# Patient Record
Sex: MALE | URBAN - NONMETROPOLITAN AREA
[De-identification: names, ages, dates, MRNs, and addresses within clinical notes are randomized per-mention and may not be internally consistent; named-entity substitution may affect disease eponyms.]

---

## 2023-11-20 ENCOUNTER — APPOINTMENT (RX ONLY)
Dept: URBAN - NONMETROPOLITAN AREA CLINIC 15 | Facility: CLINIC | Age: 53
Setting detail: DERMATOLOGY
End: 2023-11-20

## 2023-11-20 DIAGNOSIS — L663 OTHER SPECIFIED DISEASES OF HAIR AND HAIR FOLLICLES: ICD-10-CM

## 2023-11-20 DIAGNOSIS — L70.8 OTHER ACNE: ICD-10-CM

## 2023-11-20 DIAGNOSIS — L73.9 FOLLICULAR DISORDER, UNSPECIFIED: ICD-10-CM

## 2023-11-20 DIAGNOSIS — L738 OTHER SPECIFIED DISEASES OF HAIR AND HAIR FOLLICLES: ICD-10-CM

## 2023-11-20 DIAGNOSIS — L21.8 OTHER SEBORRHEIC DERMATITIS: ICD-10-CM

## 2023-11-20 PROBLEM — L02.426 FURUNCLE OF LEFT LOWER LIMB: Status: ACTIVE | Noted: 2023-11-20

## 2023-11-20 PROBLEM — L02.425 FURUNCLE OF RIGHT LOWER LIMB: Status: ACTIVE | Noted: 2023-11-20

## 2023-11-20 PROCEDURE — ? COUNSELING

## 2023-11-20 PROCEDURE — 99203 OFFICE O/P NEW LOW 30 MIN: CPT | Mod: 25

## 2023-11-20 PROCEDURE — ? INJECTION

## 2023-11-20 PROCEDURE — 11900 INJECT SKIN LESIONS </W 7: CPT

## 2023-11-20 ASSESSMENT — LOCATION ZONE DERM
LOCATION ZONE: FACE
LOCATION ZONE: LEG

## 2023-11-20 ASSESSMENT — LOCATION SIMPLE DESCRIPTION DERM
LOCATION SIMPLE: LEFT POSTERIOR THIGH
LOCATION SIMPLE: RIGHT POSTERIOR THIGH
LOCATION SIMPLE: CHIN
LOCATION SIMPLE: LEFT EYEBROW
LOCATION SIMPLE: GLABELLA

## 2023-11-20 ASSESSMENT — LOCATION DETAILED DESCRIPTION DERM
LOCATION DETAILED: RIGHT PROXIMAL POSTERIOR THIGH
LOCATION DETAILED: GLABELLA
LOCATION DETAILED: LEFT CENTRAL EYEBROW
LOCATION DETAILED: LEFT CHIN
LOCATION DETAILED: LEFT PROXIMAL LATERAL POSTERIOR THIGH

## 2023-11-20 NOTE — PROCEDURE: INJECTION
Dose Administered (Numbers Only - Mg, G, Mcg, Units, Cc): 2.5
Treatment Number: 1
Procedure Information: Please note that the numeric value listed in the Medication (1) and associated J-code units and Medication (2) and associated J-code units variables are j-code amounts and do not represent either the concentration or the total amount of the medications injected.  I strongly recommend selecting no to the Render J-code information in note question. This will allow your note to be more clear. If you are billing j-codes with your injection codes you need to document the total amount of the medication injected. This amount should match the j-code units. For example, if you are injecting Triamcinolone 40mg as an intramuscular injection you would select 40 for the dose field.. This would allow you to document  with 4 units (40mg = 10mg x 4). The total volume is not used to calculate j-codes only the amount of the medication administered.
Consent: The risks of the medication was reviewed with the patient.
Type Of Vial Used?: Multi-Dose
Medication (1) And Associated J-Code Units: Triamcinolone acetonide, 10mg
Hide Second Medication?: No
Lot # (Optional): [id number]
Detail Level: None
Post-Care Instructions: I reviewed with the patient in detail post-care instructions. Patient understands to keep the injection sites clean and call the clinic if there is any redness, swelling or pain.
Route: IL
Total Volume Injected In Cc (Will Not Affected Billing): 0.2
Expiration Date (Optional): 09/2025
Dose Administered (Numbers Only - Mg, G, Mcg, Units, Cc): 0

## 2024-01-16 ENCOUNTER — OFFICE VISIT (OUTPATIENT)
Dept: URGENT CARE | Facility: PHYSICIAN GROUP | Age: 54
End: 2024-01-16
Payer: COMMERCIAL

## 2024-01-16 ENCOUNTER — HOSPITAL ENCOUNTER (OUTPATIENT)
Dept: LAB | Facility: MEDICAL CENTER | Age: 54
End: 2024-01-16
Attending: NURSE PRACTITIONER
Payer: COMMERCIAL

## 2024-01-16 VITALS
TEMPERATURE: 97.2 F | HEART RATE: 87 BPM | OXYGEN SATURATION: 97 % | SYSTOLIC BLOOD PRESSURE: 122 MMHG | BODY MASS INDEX: 28.63 KG/M2 | DIASTOLIC BLOOD PRESSURE: 78 MMHG | HEIGHT: 70 IN | WEIGHT: 200 LBS | RESPIRATION RATE: 16 BRPM

## 2024-01-16 DIAGNOSIS — M79.672 LEFT FOOT PAIN: ICD-10-CM

## 2024-01-16 DIAGNOSIS — R22.42 LOCALIZED SWELLING OF LEFT FOOT: ICD-10-CM

## 2024-01-16 DIAGNOSIS — M10.472 ACUTE GOUT DUE TO OTHER SECONDARY CAUSE INVOLVING TOE OF LEFT FOOT: ICD-10-CM

## 2024-01-16 LAB
BASOPHILS # BLD AUTO: 0.7 % (ref 0–1.8)
BASOPHILS # BLD: 0.08 K/UL (ref 0–0.12)
EOSINOPHIL # BLD AUTO: 0.12 K/UL (ref 0–0.51)
EOSINOPHIL NFR BLD: 1.1 % (ref 0–6.9)
ERYTHROCYTE [DISTWIDTH] IN BLOOD BY AUTOMATED COUNT: 50.2 FL (ref 35.9–50)
HCT VFR BLD AUTO: 40.4 % (ref 42–52)
HGB BLD-MCNC: 13.8 G/DL (ref 14–18)
IMM GRANULOCYTES # BLD AUTO: 0.11 K/UL (ref 0–0.11)
IMM GRANULOCYTES NFR BLD AUTO: 1 % (ref 0–0.9)
LYMPHOCYTES # BLD AUTO: 2.33 K/UL (ref 1–4.8)
LYMPHOCYTES NFR BLD: 21.5 % (ref 22–41)
MCH RBC QN AUTO: 33.6 PG (ref 27–33)
MCHC RBC AUTO-ENTMCNC: 34.2 G/DL (ref 32.3–36.5)
MCV RBC AUTO: 98.3 FL (ref 81.4–97.8)
MONOCYTES # BLD AUTO: 1.34 K/UL (ref 0–0.85)
MONOCYTES NFR BLD AUTO: 12.3 % (ref 0–13.4)
NEUTROPHILS # BLD AUTO: 6.88 K/UL (ref 1.82–7.42)
NEUTROPHILS NFR BLD: 63.4 % (ref 44–72)
NRBC # BLD AUTO: 0 K/UL
NRBC BLD-RTO: 0 /100 WBC (ref 0–0.2)
PLATELET # BLD AUTO: 177 K/UL (ref 164–446)
PMV BLD AUTO: 10.3 FL (ref 9–12.9)
RBC # BLD AUTO: 4.11 M/UL (ref 4.7–6.1)
URATE SERPL-MCNC: 8.9 MG/DL (ref 2.5–8.3)
WBC # BLD AUTO: 10.9 K/UL (ref 4.8–10.8)

## 2024-01-16 PROCEDURE — 36415 COLL VENOUS BLD VENIPUNCTURE: CPT

## 2024-01-16 PROCEDURE — 84550 ASSAY OF BLOOD/URIC ACID: CPT

## 2024-01-16 PROCEDURE — 3078F DIAST BP <80 MM HG: CPT | Performed by: NURSE PRACTITIONER

## 2024-01-16 PROCEDURE — 99204 OFFICE O/P NEW MOD 45 MIN: CPT | Performed by: NURSE PRACTITIONER

## 2024-01-16 PROCEDURE — 85025 COMPLETE CBC W/AUTO DIFF WBC: CPT

## 2024-01-16 PROCEDURE — 3074F SYST BP LT 130 MM HG: CPT | Performed by: NURSE PRACTITIONER

## 2024-01-16 RX ORDER — ATENOLOL 100 MG/1
100 TABLET ORAL DAILY
COMMUNITY

## 2024-01-16 RX ORDER — CHLORTHALIDONE 25 MG/1
25 TABLET ORAL DAILY
COMMUNITY

## 2024-01-16 RX ORDER — PREDNISONE 10 MG/1
TABLET ORAL
Qty: 21 TABLET | Refills: 0 | Status: SHIPPED | OUTPATIENT
Start: 2024-01-16 | End: 2024-01-25

## 2024-01-16 RX ORDER — INDOMETHACIN 50 MG/1
50 CAPSULE ORAL 3 TIMES DAILY
COMMUNITY
End: 2024-02-05

## 2024-01-16 RX ORDER — LOSARTAN POTASSIUM 25 MG/1
25 TABLET ORAL DAILY
COMMUNITY

## 2024-01-16 NOTE — PROGRESS NOTES
"Subjective:   Dimitri Pichardo is a 53 y.o. male who presents for Gout (X6-7 weeks of gout flare up, was taking prescription, helped some but not getting better, has been turning purple, L foot )    Patient is a 53-year-old male who presents clinic today with 7-week history of left foot pain that is worse in his great toe and ankle area.  He states that has been taking his indomethacin with minimal symptom improvement.   he denies any chest pain, shortness of breath, headaches, dizziness, chest pain, or palpitations.    He has had associated symptoms of chills and mild nausea.  Patient does drink 4 martinis a day and smokes cigarettes.    Medications, Allergies, and current problem list reviewed today in Epic.     Objective:     /78   Pulse 87   Temp 36.2 °C (97.2 °F) (Temporal)   Resp 16   Ht 1.765 m (5' 9.5\")   Wt 90.7 kg (200 lb)   SpO2 97%     Physical Exam  Vitals reviewed.   Constitutional:       General: He is not in acute distress.     Appearance: Normal appearance. He is not ill-appearing or toxic-appearing.   HENT:      Head: Normocephalic.      Nose: Nose normal.      Mouth/Throat:      Mouth: Mucous membranes are moist.   Eyes:      Extraocular Movements: Extraocular movements intact.      Conjunctiva/sclera: Conjunctivae normal.      Pupils: Pupils are equal, round, and reactive to light.   Cardiovascular:      Rate and Rhythm: Normal rate.   Pulmonary:      Effort: Pulmonary effort is normal.   Musculoskeletal:         General: Normal range of motion.      Cervical back: Normal range of motion and neck supple.   Feet:      Comments: Left foot: Purple in color, swelling noted, tender to palpation greater over great toe joint.  Pedal pulse 2+.  Neurovascular distally intact.  Negative for Homans' sign or calf pain.  Ambulates with limping and uses assist device of a cane.  Skin:     General: Skin is warm and dry.   Neurological:      General: No focal deficit present.      Mental Status: He is " alert and oriented to person, place, and time.   Psychiatric:         Mood and Affect: Mood normal.         Behavior: Behavior normal.         Assessment/Plan:     Diagnosis and associated orders:     1. Acute gout due to other secondary cause involving toe of left foot  predniSONE (DELTASONE) 10 MG Tab      2. Left foot pain  US-EXTREMITY VENOUS LOWER UNILAT LEFT    CBC WITH DIFFERENTIAL    URIC ACID    predniSONE (DELTASONE) 10 MG Tab      3. Localized swelling of left foot  US-EXTREMITY VENOUS LOWER UNILAT LEFT    CBC WITH DIFFERENTIAL    URIC ACID    predniSONE (DELTASONE) 10 MG Tab         Comments/MDM:     This a chronic condition with exacerbation.  Patient has had 7-week history of left foot pain that is worse in the great toe region.  On physical exam, foot is swollen, tender to palpation over great toe joint and ankle, and is purple in color.  Pedal pulses 2+.  Neurovascularly distally intact.  Patient does not have any calf pain and has negative Homans' sign.  Patient does drink heavily and smokes cigarettes.  Has been using indomethacin for the past 7 weeks without any symptom relief.  Discussed with patient differentials, lab orders placed this seen above.  Ultrasound of left lower leg was ordered, recommended patient complete at his earliest convenience.  Will go ahead and treat with prednisone for most likely gout flare.  Stressed the patient the importance of not drinking alcohol and to work on cutting back to help prevent gout flares in the future.  ER precautions were discussed.  Patient was involved with shared decision-making throughout the exam today and verbalizes understanding regards to plan of care, discharge instructions, and follow-up         Differential diagnosis, natural history, supportive care, and indications for immediate follow-up discussed.    Advised the patient to follow-up with the primary care physician for recheck, reevaluation, and consideration of further management.    I  personally reviewed prior external notes and test results pertinent to today's visit as well as additional imaging and testing completed in clinic today.     Please note that this dictation was created using voice recognition software. I have made a reasonable attempt to correct obvious errors, but I expect that there are errors of grammar and possibly content that I did not discover before finalizing the note.

## 2024-02-05 ENCOUNTER — OFFICE VISIT (OUTPATIENT)
Dept: URGENT CARE | Facility: PHYSICIAN GROUP | Age: 54
End: 2024-02-05
Payer: COMMERCIAL

## 2024-02-05 ENCOUNTER — APPOINTMENT (OUTPATIENT)
Dept: RADIOLOGY | Facility: IMAGING CENTER | Age: 54
End: 2024-02-05
Attending: FAMILY MEDICINE
Payer: COMMERCIAL

## 2024-02-05 VITALS
BODY MASS INDEX: 29.92 KG/M2 | RESPIRATION RATE: 16 BRPM | OXYGEN SATURATION: 97 % | HEART RATE: 67 BPM | TEMPERATURE: 96.9 F | WEIGHT: 202 LBS | HEIGHT: 69 IN

## 2024-02-05 DIAGNOSIS — R05.1 ACUTE COUGH: ICD-10-CM

## 2024-02-05 DIAGNOSIS — R06.2 WHEEZE: ICD-10-CM

## 2024-02-05 DIAGNOSIS — J32.9 RHINOSINUSITIS: ICD-10-CM

## 2024-02-05 PROCEDURE — 71046 X-RAY EXAM CHEST 2 VIEWS: CPT | Mod: TC,FY | Performed by: RADIOLOGY

## 2024-02-05 PROCEDURE — 99213 OFFICE O/P EST LOW 20 MIN: CPT | Performed by: FAMILY MEDICINE

## 2024-02-05 RX ORDER — ALBUTEROL SULFATE 90 UG/1
2 AEROSOL, METERED RESPIRATORY (INHALATION) EVERY 4 HOURS PRN
Qty: 1 EACH | Refills: 0 | Status: SHIPPED | OUTPATIENT
Start: 2024-02-05

## 2024-02-05 RX ORDER — AMOXICILLIN AND CLAVULANATE POTASSIUM 875; 125 MG/1; MG/1
1 TABLET, FILM COATED ORAL 2 TIMES DAILY
Qty: 14 TABLET | Refills: 0 | Status: SHIPPED | OUTPATIENT
Start: 2024-02-05 | End: 2024-02-12

## 2024-02-05 RX ORDER — DEXTROMETHORPHAN HYDROBROMIDE AND PROMETHAZINE HYDROCHLORIDE 15; 6.25 MG/5ML; MG/5ML
5 SYRUP ORAL 4 TIMES DAILY PRN
Qty: 120 ML | Refills: 0 | Status: SHIPPED | OUTPATIENT
Start: 2024-02-05

## 2024-02-05 ASSESSMENT — ENCOUNTER SYMPTOMS
EYE DISCHARGE: 0
WEIGHT LOSS: 0
MYALGIAS: 0
NAUSEA: 0
EYE REDNESS: 0
VOMITING: 0

## 2024-02-05 NOTE — PROGRESS NOTES
"Subjective     Dimitri Pichardo is a 53 y.o. male who presents with Cough (Sx 2 wks going into chest. Not sleeping )            2 weeks productive cough without blood in sputum.  Symptoms started right after completing a course of corticosteroid for gout.  No fever. Mild SOB/wheeze. No PMH asthma/pneumonia. Recently quit smoking. No limb swelling. Nasal congestion and drainage. No other aggravating or alleviating factors.           Review of Systems   Constitutional:  Negative for malaise/fatigue and weight loss.   Eyes:  Negative for discharge and redness.   Gastrointestinal:  Negative for nausea and vomiting.   Musculoskeletal:  Negative for joint pain and myalgias.   Skin:  Negative for itching and rash.              Objective     Pulse 67   Temp 36.1 °C (96.9 °F) (Temporal)   Resp 16   Ht 1.753 m (5' 9\")   Wt 91.6 kg (202 lb)   SpO2 97%   BMI 29.83 kg/m²      Physical Exam  Constitutional:       General: He is not in acute distress.     Appearance: He is well-developed.   HENT:      Head: Normocephalic and atraumatic.      Right Ear: Tympanic membrane normal.      Left Ear: Tympanic membrane normal.      Nose: Congestion present.      Mouth/Throat:      Comments: PND  Eyes:      Conjunctiva/sclera: Conjunctivae normal.   Cardiovascular:      Rate and Rhythm: Normal rate and regular rhythm.      Heart sounds: Normal heart sounds. No murmur heard.  Pulmonary:      Effort: Pulmonary effort is normal.      Breath sounds: Wheezing and rhonchi present.   Skin:     General: Skin is warm and dry.      Findings: No rash.   Neurological:      Mental Status: He is alert.                             Assessment & Plan       CXR: no acute cardiopulmonary process per radiology    1. Acute cough  DX-CHEST-2 VIEWS    promethazine-dextromethorphan (PROMETHAZINE-DM) 6.25-15 MG/5ML syrup      2. Wheeze  DX-CHEST-2 VIEWS    budesonide (PULMICORT) 90 MCG/ACT AEROSOL POWDER, BREATH ACTIVATED    albuterol 108 (90 Base) MCG/ACT " Aero Soln inhalation aerosol      3. Rhinosinusitis  amoxicillin-clavulanate (AUGMENTIN) 875-125 MG Tab        Differential diagnosis, natural history, supportive care, and indications for immediate follow-up were discussed.